# Patient Record
Sex: FEMALE | Race: BLACK OR AFRICAN AMERICAN | URBAN - METROPOLITAN AREA
[De-identification: names, ages, dates, MRNs, and addresses within clinical notes are randomized per-mention and may not be internally consistent; named-entity substitution may affect disease eponyms.]

---

## 2023-10-02 ENCOUNTER — TELEPHONE (OUTPATIENT)
Dept: PRIMARY CARE | Facility: CLINIC | Age: 38
End: 2023-10-02

## 2023-10-02 DIAGNOSIS — I10 ESSENTIAL HYPERTENSION: Primary | ICD-10-CM

## 2023-10-02 RX ORDER — IRBESARTAN AND HYDROCHLOROTHIAZIDE 150; 12.5 MG/1; MG/1
1 TABLET, FILM COATED ORAL DAILY
Qty: 90 TABLET | Refills: 1 | Status: SHIPPED | OUTPATIENT
Start: 2023-10-02 | End: 2024-01-02 | Stop reason: SDUPTHER

## 2023-10-02 RX ORDER — IBUPROFEN 800 MG/1
800 TABLET ORAL EVERY 8 HOURS PRN
COMMUNITY
End: 2024-01-02 | Stop reason: SDUPTHER

## 2023-10-02 RX ORDER — CETIRIZINE HYDROCHLORIDE 10 MG/1
10 TABLET ORAL DAILY
COMMUNITY
Start: 2023-03-23

## 2023-10-02 RX ORDER — IRBESARTAN AND HYDROCHLOROTHIAZIDE 150; 12.5 MG/1; MG/1
1 TABLET, FILM COATED ORAL DAILY
COMMUNITY
End: 2023-10-02 | Stop reason: SDUPTHER

## 2023-10-02 NOTE — TELEPHONE ENCOUNTER
Rx Refill Request Telephone Encounter    Name:  Coreysarah MUSE Parmar  :  [id number]  Medication Name:  AVIRBESARTAN            Specific Pharmacy location:  [facility]    Date of next appointment:  10/10/23 @ 11:00  Best number to reach patient:  [phone]

## 2023-10-09 PROBLEM — G89.29 OTHER CHRONIC PAIN: Status: ACTIVE | Noted: 2023-10-09

## 2023-10-09 PROBLEM — E55.9 VITAMIN D DEFICIENCY: Status: ACTIVE | Noted: 2023-10-09

## 2023-10-09 PROBLEM — I10 HYPERTENSION: Status: ACTIVE | Noted: 2023-10-09

## 2023-10-09 RX ORDER — MELOXICAM 7.5 MG/1
7.5 TABLET ORAL DAILY
COMMUNITY
Start: 2023-03-23

## 2023-10-09 RX ORDER — CHOLECALCIFEROL (VITAMIN D3) 50 MCG
2000 TABLET ORAL DAILY
COMMUNITY

## 2023-10-10 ENCOUNTER — APPOINTMENT (OUTPATIENT)
Dept: PRIMARY CARE | Facility: CLINIC | Age: 38
End: 2023-10-10

## 2023-10-13 ENCOUNTER — APPOINTMENT (OUTPATIENT)
Dept: PRIMARY CARE | Facility: CLINIC | Age: 38
End: 2023-10-13

## 2023-11-12 NOTE — PROGRESS NOTES
Subjective   Rosa M Parmar is a 37 y.o. female who presents for No chief complaint on file..    HPI:      Presenting for    ROS:   Review of systems is essentially negative for all systems except for any identified issues in HPI above.    Objective     There were no vitals taken for this visit.     PHYSICAL EXAM    GENERAL  Well-appearing, pleasant and cooperative.  No acute distress.    HEENT  HEAD:   Normocephalic.  Atraumatic.  EYES:  PERRLA.  No scleral icterus or conjunctival injection.  EARS:  Tympanic membranes visualized bilaterally without erythema, fluid, or bulging.  NECK:  No adenopathy.  No palpable thyroid enlargement or nodules.    THROAT:  Moist oropharynx without tonsillar enlargement or exudates.    LUNGS:    Clear to auscultation bilaterally.  No wheezes, rales, rhonchi.    CARDIAC:  Regular rate and rhythm.  Normal S1S2.  No murmurs/rubs/gallops.    ABDOMEN:  Soft, non-tender, non-distended.  No hepatosplenomegaly.  Normoactive bowel sounds.    MUSCULOSKELETAL:  No gross abnormalities.   No joint swelling or erythema,.  No spinal or paraspinal tenderness to palpation.    EXTREMITIES:  No LE edema or cyanosis.      NEURO           Alert and oriented x3. No focal deficits.    PSYCH:          Affect appropriate.           Assessment/Plan   Problem List Items Addressed This Visit    None           Harriett Keating MD

## 2023-11-13 ENCOUNTER — APPOINTMENT (OUTPATIENT)
Dept: PRIMARY CARE | Facility: CLINIC | Age: 38
End: 2023-11-13
Payer: OTHER GOVERNMENT

## 2023-12-29 NOTE — PROGRESS NOTES
Subjective   Rosa M Parmar is a 38 y.o. female who presents for Med Refill.    HPI:      38-year-old female presenting for medication review.  Seen by me for new patient physical exam and UC follow up on 3/23/2023.        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  She had run out of her blood pressure medication, and went to urgent care prior to her last visit with me where she was restarted on her previous medication at the same dose: Irbesartan hydrochlorothiazide 150-12.5 mg 1 tablet daily.  She had had an interval decrease in blood pressure from urgent care value of 190/106 to 140/98.  Since she had only been on the medication 1 week, the plan was to continue at current dose and have her follow-up in 2 weeks for blood pressure check/labs/med review.  Labs were not completed, no-show to appointments on 10/16/2023 and 11/1/2023.    Hx of Boils:  Not painful.  Ongoing/chronic issue.  Has had painful ones requiring       ROS:   Review of systems is essentially negative for all systems except for any identified issues in HPI above.    Objective     /90   Pulse (!) 121   Temp 36.3 °C (97.3 °F)   Ht 1.524 m (5')   Wt 79.8 kg (176 lb)   SpO2 95%   BMI 34.37 kg/m²      PHYSICAL EXAM    GENERAL  Well-appearing, pleasant and cooperative.  No acute distress.    HEENT  HEAD:   Normocephalic.  Atraumatic.  EYES:  No scleral icterus or conjunctival injection.  NECK:  No adenopathy.  No palpable thyroid enlargement or nodules.    THROAT:  Moist oropharynx without tonsillar enlargement or exudates.    LUNGS:    Clear to auscultation bilaterally.  No wheezes, rales, rhonchi.    CARDIAC:  Regular rate and rhythm.  Normal S1S2.  No murmurs/rubs/gallops.    ABDOMEN:  Soft, non-tender, non-distended.  No hepatosplenomegaly.  Normoactive bowel sounds.    :  Inguinal scarring, some slightly raised hair follicles without erythema, increased warmth,    MUSCULOSKELETAL:  No gross  abnormalities.   No joint swelling or erythema,.  No spinal or paraspinal tenderness to palpation.    EXTREMITIES:  No LE edema or cyanosis.      NEURO   Alert and oriented x3. No focal deficits.    PSYCH:   Affect appropriate.           Assessment/Plan   Problem List Items Addressed This Visit       Hypertension - Primary     Increasing irbesartan hydrochlorothiazide dose today to 2 tablets daily.         Relevant Orders    CBC    Comprehensive metabolic panel    Vitamin D deficiency    Relevant Orders    Vitamin D 25-Hydroxy,Total (for eval of Vitamin D levels)     Other Visit Diagnoses       Preventative health care        Bloodwork reordered from March physical.  Patient agrees to go get this done in the near future.    Relevant Orders    CBC    Comprehensive metabolic panel    Tsh With Reflex To Free T4 If Abnormal    Screening for cardiovascular condition        Relevant Orders    Lipid panel    Medication refill        Relevant Medications    ibuprofen 800 mg tablet    Essential hypertension        Relevant Medications    irbesartan-hydrochlorothiazide (Avalide) 150-12.5 mg tablet    Class 1 obesity with body mass index (BMI) of 34.0 to 34.9 in adult, unspecified obesity type, unspecified whether serious comorbidity present        Requesting Ozempic or other medical assistance with weight management.  Agrees to do bloodwork ordered today and follow up for further management.                 Harriett Keating MD

## 2024-01-02 ENCOUNTER — OFFICE VISIT (OUTPATIENT)
Dept: PRIMARY CARE | Facility: CLINIC | Age: 39
End: 2024-01-02
Payer: OTHER GOVERNMENT

## 2024-01-02 VITALS
OXYGEN SATURATION: 95 % | DIASTOLIC BLOOD PRESSURE: 90 MMHG | BODY MASS INDEX: 34.55 KG/M2 | TEMPERATURE: 97.3 F | WEIGHT: 176 LBS | SYSTOLIC BLOOD PRESSURE: 160 MMHG | HEART RATE: 121 BPM | HEIGHT: 60 IN

## 2024-01-02 DIAGNOSIS — Z76.0 MEDICATION REFILL: ICD-10-CM

## 2024-01-02 DIAGNOSIS — E66.9 CLASS 1 OBESITY WITH BODY MASS INDEX (BMI) OF 34.0 TO 34.9 IN ADULT, UNSPECIFIED OBESITY TYPE, UNSPECIFIED WHETHER SERIOUS COMORBIDITY PRESENT: ICD-10-CM

## 2024-01-02 DIAGNOSIS — I10 ESSENTIAL HYPERTENSION: ICD-10-CM

## 2024-01-02 DIAGNOSIS — Z00.00 PREVENTATIVE HEALTH CARE: ICD-10-CM

## 2024-01-02 DIAGNOSIS — I10 HYPERTENSION, UNSPECIFIED TYPE: Primary | ICD-10-CM

## 2024-01-02 DIAGNOSIS — Z13.6 SCREENING FOR CARDIOVASCULAR CONDITION: ICD-10-CM

## 2024-01-02 DIAGNOSIS — E55.9 VITAMIN D DEFICIENCY: ICD-10-CM

## 2024-01-02 PROCEDURE — 3008F BODY MASS INDEX DOCD: CPT | Performed by: STUDENT IN AN ORGANIZED HEALTH CARE EDUCATION/TRAINING PROGRAM

## 2024-01-02 PROCEDURE — 99214 OFFICE O/P EST MOD 30 MIN: CPT | Performed by: STUDENT IN AN ORGANIZED HEALTH CARE EDUCATION/TRAINING PROGRAM

## 2024-01-02 PROCEDURE — 3080F DIAST BP >= 90 MM HG: CPT | Performed by: STUDENT IN AN ORGANIZED HEALTH CARE EDUCATION/TRAINING PROGRAM

## 2024-01-02 PROCEDURE — 3077F SYST BP >= 140 MM HG: CPT | Performed by: STUDENT IN AN ORGANIZED HEALTH CARE EDUCATION/TRAINING PROGRAM

## 2024-01-02 RX ORDER — IBUPROFEN 800 MG/1
800 TABLET ORAL EVERY 8 HOURS PRN
Qty: 30 TABLET | Refills: 0 | Status: SHIPPED | OUTPATIENT
Start: 2024-01-02

## 2024-01-02 RX ORDER — IRBESARTAN AND HYDROCHLOROTHIAZIDE 150; 12.5 MG/1; MG/1
2 TABLET, FILM COATED ORAL DAILY
Qty: 60 TABLET | Refills: 0 | Status: SHIPPED | OUTPATIENT
Start: 2024-01-02 | End: 2024-01-30

## 2024-01-02 ASSESSMENT — PATIENT HEALTH QUESTIONNAIRE - PHQ9
SUM OF ALL RESPONSES TO PHQ9 QUESTIONS 1 AND 2: 0
2. FEELING DOWN, DEPRESSED OR HOPELESS: NOT AT ALL
1. LITTLE INTEREST OR PLEASURE IN DOING THINGS: NOT AT ALL

## 2024-01-02 ASSESSMENT — PAIN SCALES - GENERAL: PAINLEVEL: 1

## 2024-01-02 NOTE — PATIENT INSTRUCTIONS
Thank you for coming to see me today.    We will increase your dose of blood pressure medication to 2 tablets daily, new prescription sent to pharmacy.    Ibuprofen refill also sent to pharmacy.    Go to the lab for fasting blood work, we will call you with lab results.    Follow-up with me in 2 weeks (approximately) for blood pressure check, lab review, and further discussion of your other concerns.

## 2024-01-30 DIAGNOSIS — I10 ESSENTIAL HYPERTENSION: ICD-10-CM

## 2024-01-30 RX ORDER — IRBESARTAN AND HYDROCHLOROTHIAZIDE 150; 12.5 MG/1; MG/1
2 TABLET, FILM COATED ORAL DAILY
Qty: 90 TABLET | Refills: 3 | Status: SHIPPED | OUTPATIENT
Start: 2024-01-30

## 2024-06-26 DIAGNOSIS — Z76.0 MEDICATION REFILL: ICD-10-CM

## 2024-06-26 DIAGNOSIS — I10 ESSENTIAL HYPERTENSION: ICD-10-CM

## 2024-06-26 RX ORDER — IRBESARTAN AND HYDROCHLOROTHIAZIDE 150; 12.5 MG/1; MG/1
2 TABLET, FILM COATED ORAL DAILY
Qty: 60 TABLET | Refills: 0 | Status: SHIPPED | OUTPATIENT
Start: 2024-06-26 | End: 2024-07-26

## 2024-06-26 NOTE — TELEPHONE ENCOUNTER
30-day Rx sent, no refills.  It was requested that she return in 2 weeks after OV on 1/2/2024 for blood pressure check/med review, very overdue for follow-up visit.

## 2024-06-26 NOTE — TELEPHONE ENCOUNTER
PT requesting refill on RX irbesartan-hydrochlorothiazide.    Please send to [facility] on [address] in [place]

## 2024-06-27 NOTE — TELEPHONE ENCOUNTER
PT states she has been on this medication for over 10 years with no changes in dosage. PT states she can get copies of all of her past RX bottles showing this dosage. PT states she was told at her last appointment 1/2/2024, that she would not need to be seen for another year. PT confused and argumentative about needing a follow up appointment for additional refills. PT scheduled for follow up 8/2/2024, but wants to know if she really needs the appointment, as she does not want to have to pay a co-pay or bill her insurance. Please advise.

## 2024-06-27 NOTE — TELEPHONE ENCOUNTER
Her blood pressure was very elevated at her last visit with me in January - 160/90.  While we kept the medication the same we did double her dose at that time.  It is standard protocol to recheck blood pressure in 2 weeks if blood pressure is elevated in clinic to make sure it returns to normal after adjustments are made.    For me to continue managing her medications she does need to come in for appropriate follow up visits.  For reference I have copied my patient instruction notes from her last visit in January below (relevant portions in bold)    Thank you for coming to see me today.     We will increase your dose of blood pressure medication to 2 tablets daily, new prescription sent to pharmacy.     Ibuprofen refill also sent to pharmacy.     Go to the lab for fasting blood work, we will call you with lab results.     Follow-up with me in 2 weeks (approximately) for blood pressure check, lab review, and further discussion of your other concerns.

## 2024-08-29 ENCOUNTER — TELEPHONE (OUTPATIENT)
Dept: PRIMARY CARE | Facility: CLINIC | Age: 39
End: 2024-08-29

## 2024-08-29 ENCOUNTER — APPOINTMENT (OUTPATIENT)
Dept: PRIMARY CARE | Facility: CLINIC | Age: 39
End: 2024-08-29
Payer: OTHER GOVERNMENT

## 2024-08-29 VITALS
SYSTOLIC BLOOD PRESSURE: 120 MMHG | HEIGHT: 60 IN | DIASTOLIC BLOOD PRESSURE: 70 MMHG | WEIGHT: 164 LBS | HEART RATE: 78 BPM | OXYGEN SATURATION: 98 % | BODY MASS INDEX: 32.2 KG/M2

## 2024-08-29 DIAGNOSIS — Z12.31 SCREENING MAMMOGRAM FOR BREAST CANCER: ICD-10-CM

## 2024-08-29 DIAGNOSIS — I10 PRIMARY HYPERTENSION: ICD-10-CM

## 2024-08-29 DIAGNOSIS — E55.9 VITAMIN D DEFICIENCY: ICD-10-CM

## 2024-08-29 DIAGNOSIS — H60.93 OTITIS EXTERNA OF BOTH EARS, UNSPECIFIED CHRONICITY, UNSPECIFIED TYPE: ICD-10-CM

## 2024-08-29 DIAGNOSIS — I10 HYPERTENSION, UNSPECIFIED TYPE: ICD-10-CM

## 2024-08-29 DIAGNOSIS — S30.821D: ICD-10-CM

## 2024-08-29 DIAGNOSIS — Z00.00 ROUTINE GENERAL MEDICAL EXAMINATION AT A HEALTH CARE FACILITY: Primary | ICD-10-CM

## 2024-08-29 DIAGNOSIS — Z11.59 NEED FOR HEPATITIS C SCREENING TEST: ICD-10-CM

## 2024-08-29 PROBLEM — Z98.891 HISTORY OF CESAREAN SECTION: Status: RESOLVED | Noted: 2022-05-18 | Resolved: 2024-08-29

## 2024-08-29 PROBLEM — L65.9 HAIR LOSS: Status: RESOLVED | Noted: 2022-01-07 | Resolved: 2024-08-29

## 2024-08-29 PROBLEM — R11.10 VOMITING: Status: RESOLVED | Noted: 2024-08-29 | Resolved: 2024-08-29

## 2024-08-29 PROBLEM — G47.00 INSOMNIA: Status: ACTIVE | Noted: 2022-05-18

## 2024-08-29 PROBLEM — D72.829 LEUKOCYTOSIS: Status: ACTIVE | Noted: 2022-08-27

## 2024-08-29 PROBLEM — N92.1 MENOMETRORRHAGIA: Status: RESOLVED | Noted: 2022-05-18 | Resolved: 2024-08-29

## 2024-08-29 PROBLEM — R11.0 NAUSEA: Status: RESOLVED | Noted: 2024-08-29 | Resolved: 2024-08-29

## 2024-08-29 PROBLEM — G43.109 MIGRAINE WITH AURA: Status: RESOLVED | Noted: 2022-05-18 | Resolved: 2024-08-29

## 2024-08-29 PROBLEM — M54.50 LOWER BACK PAIN: Status: RESOLVED | Noted: 2022-05-18 | Resolved: 2024-08-29

## 2024-08-29 PROBLEM — H43.399 VITREOUS FLOATERS: Status: RESOLVED | Noted: 2022-05-18 | Resolved: 2024-08-29

## 2024-08-29 PROCEDURE — 3074F SYST BP LT 130 MM HG: CPT

## 2024-08-29 PROCEDURE — 99214 OFFICE O/P EST MOD 30 MIN: CPT

## 2024-08-29 PROCEDURE — 99396 PREV VISIT EST AGE 40-64: CPT

## 2024-08-29 PROCEDURE — 3008F BODY MASS INDEX DOCD: CPT

## 2024-08-29 PROCEDURE — 3078F DIAST BP <80 MM HG: CPT

## 2024-08-29 RX ORDER — IRBESARTAN AND HYDROCHLOROTHIAZIDE 150; 12.5 MG/1; MG/1
2 TABLET, FILM COATED ORAL DAILY
Qty: 180 TABLET | Refills: 1 | Status: SHIPPED | OUTPATIENT
Start: 2024-08-29 | End: 2025-02-25

## 2024-08-29 RX ORDER — CIPROFLOXACIN AND DEXAMETHASONE 3; 1 MG/ML; MG/ML
4 SUSPENSION/ DROPS AURICULAR (OTIC) 2 TIMES DAILY
Qty: 7.5 ML | Refills: 0 | Status: SHIPPED | OUTPATIENT
Start: 2024-08-29 | End: 2024-09-05

## 2024-08-29 ASSESSMENT — ENCOUNTER SYMPTOMS
BLOOD IN STOOL: 0
NERVOUS/ANXIOUS: 0
HEMATURIA: 0
SHORTNESS OF BREATH: 0
CHILLS: 0
TROUBLE SWALLOWING: 0
NUMBNESS: 0
FEVER: 0
VOMITING: 0
COUGH: 0
MYALGIAS: 0
SORE THROAT: 0
RHINORRHEA: 0
NAUSEA: 0
HEADACHES: 0
WEAKNESS: 0
ARTHRALGIAS: 0
ABDOMINAL PAIN: 0
DYSPHORIC MOOD: 0
DYSURIA: 0
UNEXPECTED WEIGHT CHANGE: 0

## 2024-08-29 ASSESSMENT — COLUMBIA-SUICIDE SEVERITY RATING SCALE - C-SSRS: 1. IN THE PAST MONTH, HAVE YOU WISHED YOU WERE DEAD OR WISHED YOU COULD GO TO SLEEP AND NOT WAKE UP?: NO

## 2024-08-29 ASSESSMENT — PAIN SCALES - GENERAL: PAINLEVEL: 3

## 2024-08-29 ASSESSMENT — LIFESTYLE VARIABLES
HOW OFTEN DO YOU HAVE A DRINK CONTAINING ALCOHOL: NEVER
HOW OFTEN DO YOU HAVE SIX OR MORE DRINKS ON ONE OCCASION: NEVER
SKIP TO QUESTIONS 9-10: 1
AUDIT-C TOTAL SCORE: 0
HOW MANY STANDARD DRINKS CONTAINING ALCOHOL DO YOU HAVE ON A TYPICAL DAY: PATIENT DOES NOT DRINK

## 2024-08-29 ASSESSMENT — PATIENT HEALTH QUESTIONNAIRE - PHQ9
1. LITTLE INTEREST OR PLEASURE IN DOING THINGS: NOT AT ALL
2. FEELING DOWN, DEPRESSED OR HOPELESS: NOT AT ALL
SUM OF ALL RESPONSES TO PHQ9 QUESTIONS 1 AND 2: 0

## 2024-08-29 NOTE — PROGRESS NOTES
Subjective   Patient ID: Rosa M Parmar is a 38 y.o. female who presents for New Patient Visit (Boils near groin area for the past 6 months ) and Annual Exam (Last EKG- 8/27/2022).    HPI   Rosa M Parmar is a new patient here to establish care and seen for her comprehensive physical exam. PMH, SH, FH, medications were reviewed and updated.     CHRONIC ISSUES:   HTN: On Irbesartan-hydrochlorithiazide 150-12.5mg BID. No medication side effects. Does not check home BP. Denies chest pain, heart palpitations, SOB, dizziness, headaches, changes of vision, syncope, leg swelling.     Boils in groin, bilateral. Happen in same spot, around menstrual cycle. Reports having drained, abx therapy.     IMMUNIZATIONS:   Influenza - gets occasionally   Tdap - 2011, declines     LUNG CANCER SCREENING (50-77 y.o. with 20+ pack year hx & current smoker or quit <15yrs ago)  Current every day smoker.     COLORECTAL SCREENING (From 43 or 10yrs younger than family diagnosis)  Last colonoscopy - Never   Next colonoscopy due - 43   Relevant FHx - None    GYN  LMP - 08/28/24  Last Pap - Reports having in 2021 in [place].   Next Pap due - 2026 pending results    MAMMOGRAM SCREENING (From age 38)   Last mammogram - Never   Next mammogram due - Due     Saw eye doctor 2021, encouraged to make yearly appointment.     Review of Systems   Constitutional:  Negative for chills, fever and unexpected weight change.   HENT:  Positive for ear pain. Negative for congestion, hearing loss, rhinorrhea, sore throat and trouble swallowing.    Eyes:  Negative for visual disturbance.   Respiratory:  Negative for cough and shortness of breath.    Cardiovascular:  Negative for chest pain and leg swelling.   Gastrointestinal:  Negative for abdominal pain, blood in stool, nausea and vomiting.   Genitourinary:  Negative for dysuria and hematuria.   Musculoskeletal:  Negative for arthralgias and myalgias.   Skin:  Positive for rash.   Neurological:  Negative for  weakness, numbness and headaches.   Psychiatric/Behavioral:  Negative for dysphoric mood. The patient is not nervous/anxious.        Objective   /70   Pulse 78   Ht 1.524 m (5')   Wt 74.4 kg (164 lb)   LMP 08/28/2024   SpO2 98%   BMI 32.03 kg/m²     Physical Exam  Vitals and nursing note reviewed. Chaperone present: Declines chaperone.   Constitutional:       General: She is not in acute distress.  HENT:      Right Ear: Tympanic membrane normal.      Left Ear: Tympanic membrane normal.      Ears:      Comments: Bilateral erythema external canal     Nose: Nose normal.      Mouth/Throat:      Mouth: Mucous membranes are moist.   Eyes:      Extraocular Movements: Extraocular movements intact.      Pupils: Pupils are equal, round, and reactive to light.   Neck:      Vascular: No carotid bruit.   Cardiovascular:      Rate and Rhythm: Normal rate and regular rhythm.   Pulmonary:      Effort: Pulmonary effort is normal.      Breath sounds: Normal breath sounds.   Abdominal:      General: Abdomen is flat.      Palpations: Abdomen is soft.      Tenderness: There is no abdominal tenderness.   Genitourinary:     Pubic Area: Rash (Subcutaneous nodules, overlying erythema and scarring. No fluctuance, drainage, active infection noted on exam.) present.   Musculoskeletal:         General: Normal range of motion.   Lymphadenopathy:      Cervical: No cervical adenopathy.   Skin:     General: Skin is warm.      Findings: No rash.   Neurological:      General: No focal deficit present.      Mental Status: She is alert.   Psychiatric:         Mood and Affect: Mood normal.       Assessment/Plan   Assessment & Plan  Routine general medical examination at a health care facility  Preventative measures discussed. Labs ordered, will follow up with results.   Immunizations discussed.     Orders:    Hepatitis C antibody; Future    Comprehensive metabolic panel; Future    Albumin-Creatinine Ratio, Urine Random; Future    Lipid  panel; Future    Tsh With Reflex To Free T4 If Abnormal; Future    Urinalysis with Reflex Microscopic; Future    Hemoglobin A1c; Future    Screening mammogram for breast cancer    Orders:    BI mammo bilateral screening tomosynthesis; Future    Need for hepatitis C screening test    Orders:    Hepatitis C antibody; Future    Primary hypertension  Chronic. Continue Irbesartan-hydrochlorithiazide 150-12.5mg BID. DASH diet and 30 minutes of exercise 5x/week. Check home BP and keep log. Recheck in 6 months.     Orders:    Comprehensive metabolic panel; Future    Albumin-Creatinine Ratio, Urine Random; Future    Urinalysis with Reflex Microscopic; Future    irbesartan-hydrochlorothiazide (Avalide) 150-12.5 mg tablet; Take 2 tablets by mouth once daily.    Vitamin D deficiency    Orders:    Vitamin D 25-Hydroxy,Total (for eval of Vitamin D levels); Future    Otitis externa of both ears, unspecified chronicity, unspecified type  Acute.   Ciprodex drops as directed. Risks and benefits of medication discussed and prescribed.   OTC Tylenol/Ibuprofen as needed for pain.   FIN 1 week or sooner for worsening.   Orders:    ciprofloxacin-dexamethasone (CiproDEX) otic suspension; Administer 4 drops into each ear 2 times a day for 7 days.    Blister of groin without infection, subsequent encounter    Orders:    Referral to Dermatology

## 2024-08-29 NOTE — ASSESSMENT & PLAN NOTE
Chronic. Continue Irbesartan-hydrochlorithiazide 150-12.5mg BID. DASH diet and 30 minutes of exercise 5x/week. Check home BP and keep log. Recheck in 6 months.     Orders:    Comprehensive metabolic panel; Future    Albumin-Creatinine Ratio, Urine Random; Future    Urinalysis with Reflex Microscopic; Future    irbesartan-hydrochlorothiazide (Avalide) 150-12.5 mg tablet; Take 2 tablets by mouth once daily.

## 2024-09-13 ENCOUNTER — LAB (OUTPATIENT)
Dept: LAB | Facility: LAB | Age: 39
End: 2024-09-13
Payer: OTHER GOVERNMENT

## 2024-09-13 DIAGNOSIS — E55.9 VITAMIN D DEFICIENCY: ICD-10-CM

## 2024-09-13 DIAGNOSIS — I10 PRIMARY HYPERTENSION: ICD-10-CM

## 2024-09-13 DIAGNOSIS — Z00.00 ROUTINE GENERAL MEDICAL EXAMINATION AT A HEALTH CARE FACILITY: ICD-10-CM

## 2024-09-13 DIAGNOSIS — Z11.59 NEED FOR HEPATITIS C SCREENING TEST: ICD-10-CM

## 2024-09-13 LAB
25(OH)D3 SERPL-MCNC: 21 NG/ML (ref 31–100)
ALBUMIN SERPL-MCNC: 4.7 G/DL (ref 3.5–5)
ALP BLD-CCNC: 70 U/L (ref 35–125)
ALT SERPL-CCNC: 5 U/L (ref 5–40)
ANION GAP SERPL CALC-SCNC: 13 MMOL/L
AST SERPL-CCNC: 12 U/L (ref 5–40)
BILIRUB SERPL-MCNC: 0.4 MG/DL (ref 0.1–1.2)
BUN SERPL-MCNC: 6 MG/DL (ref 8–25)
CALCIUM SERPL-MCNC: 9.7 MG/DL (ref 8.5–10.4)
CHLORIDE SERPL-SCNC: 98 MMOL/L (ref 97–107)
CHOLEST SERPL-MCNC: 162 MG/DL (ref 133–200)
CHOLEST/HDLC SERPL: 4.2 {RATIO}
CO2 SERPL-SCNC: 26 MMOL/L (ref 24–31)
CREAT SERPL-MCNC: 0.6 MG/DL (ref 0.4–1.6)
EGFRCR SERPLBLD CKD-EPI 2021: >90 ML/MIN/1.73M*2
EST. AVERAGE GLUCOSE BLD GHB EST-MCNC: 97 MG/DL
GLUCOSE SERPL-MCNC: 98 MG/DL (ref 65–99)
HBA1C MFR BLD: 5 %
HCV AB SER QL: NONREACTIVE
HDLC SERPL-MCNC: 39 MG/DL
LDLC SERPL CALC-MCNC: 103 MG/DL (ref 65–130)
POTASSIUM SERPL-SCNC: 3.8 MMOL/L (ref 3.4–5.1)
PROT SERPL-MCNC: 7.5 G/DL (ref 5.9–7.9)
SODIUM SERPL-SCNC: 137 MMOL/L (ref 133–145)
TRIGL SERPL-MCNC: 99 MG/DL (ref 40–150)
TSH SERPL DL<=0.05 MIU/L-ACNC: 0.56 MIU/L (ref 0.27–4.2)

## 2024-09-13 PROCEDURE — 86803 HEPATITIS C AB TEST: CPT

## 2024-09-13 PROCEDURE — 36415 COLL VENOUS BLD VENIPUNCTURE: CPT

## 2024-10-05 DIAGNOSIS — I10 PRIMARY HYPERTENSION: ICD-10-CM

## 2024-10-07 RX ORDER — IRBESARTAN AND HYDROCHLOROTHIAZIDE 150; 12.5 MG/1; MG/1
2 TABLET, FILM COATED ORAL DAILY
Qty: 90 TABLET | OUTPATIENT
Start: 2024-10-07

## 2025-02-13 ENCOUNTER — APPOINTMENT (OUTPATIENT)
Dept: PRIMARY CARE | Facility: CLINIC | Age: 40
End: 2025-02-13
Payer: OTHER GOVERNMENT

## 2025-02-24 ENCOUNTER — APPOINTMENT (OUTPATIENT)
Dept: PRIMARY CARE | Facility: CLINIC | Age: 40
End: 2025-02-24
Payer: OTHER GOVERNMENT

## 2025-02-24 VITALS
HEART RATE: 100 BPM | DIASTOLIC BLOOD PRESSURE: 76 MMHG | TEMPERATURE: 97.4 F | OXYGEN SATURATION: 94 % | SYSTOLIC BLOOD PRESSURE: 116 MMHG | BODY MASS INDEX: 35.75 KG/M2 | WEIGHT: 177 LBS

## 2025-02-24 DIAGNOSIS — R21 RASH AND NONSPECIFIC SKIN ERUPTION: Primary | ICD-10-CM

## 2025-02-24 PROCEDURE — 99213 OFFICE O/P EST LOW 20 MIN: CPT

## 2025-02-24 PROCEDURE — 3074F SYST BP LT 130 MM HG: CPT

## 2025-02-24 PROCEDURE — 3078F DIAST BP <80 MM HG: CPT

## 2025-02-24 RX ORDER — RIFAMPIN 300 MG/1
1 CAPSULE ORAL
COMMUNITY
Start: 2025-02-14 | End: 2025-02-24 | Stop reason: SINTOL

## 2025-02-24 RX ORDER — METHYLPREDNISOLONE 4 MG/1
TABLET ORAL
Qty: 21 TABLET | Refills: 0 | Status: SHIPPED | OUTPATIENT
Start: 2025-02-24 | End: 2025-03-02

## 2025-02-24 RX ORDER — SPIRONOLACTONE 100 MG/1
1 TABLET, FILM COATED ORAL
COMMUNITY
Start: 2025-02-14

## 2025-02-24 RX ORDER — CLINDAMYCIN PHOSPHATE 11.9 MG/ML
SOLUTION TOPICAL
COMMUNITY
Start: 2025-01-17

## 2025-02-24 RX ORDER — CLINDAMYCIN HYDROCHLORIDE 300 MG/1
1 CAPSULE ORAL
COMMUNITY
Start: 2025-01-17

## 2025-02-24 ASSESSMENT — ENCOUNTER SYMPTOMS
SHORTNESS OF BREATH: 0
FEVER: 0
RHINORRHEA: 0

## 2025-02-24 ASSESSMENT — PAIN SCALES - GENERAL: PAINLEVEL_OUTOF10: 10-WORST PAIN EVER

## 2025-02-24 NOTE — PROGRESS NOTES
Subjective   Patient ID: Rosa M Parmar is a 39 y.o. female who presents for Rash (Pt has been experiencing hives for the past 4 days on hands, arms, back, legs, has taken benadryl with minimal relief. Currently on new atb per derm. Suspects this possibly be the cause of rash. Started 2 weeks ago. Stopped taking 2/23/2025).    Rash  This is a new problem. The current episode started in the past 7 days. The rash is diffuse. The rash is characterized by itchiness. Associated with: Was started on Rifampin 2 weeks ago by derm, stopped taking 2 days ago. Pertinent negatives include no congestion, facial edema, fever, rhinorrhea or shortness of breath. Past treatments include antihistamine. The treatment provided mild relief.   She stopped Rifampin about 2 days ago, symptoms seem to be improving since.    Review of Systems   Constitutional:  Negative for fever.   HENT:  Negative for congestion and rhinorrhea.    Respiratory:  Negative for shortness of breath.    Skin:  Positive for rash.     Objective   /76 (BP Location: Right arm, Patient Position: Sitting)   Pulse 100   Temp 36.3 °C (97.4 °F) (Temporal)   Wt 80.3 kg (177 lb)   LMP 02/15/2025   SpO2 94%   BMI 35.75 kg/m²     Physical Exam  Vitals and nursing note reviewed.   Constitutional:       General: She is not in acute distress.  HENT:      Mouth/Throat:      Mouth: Mucous membranes are moist.   Eyes:      Extraocular Movements: Extraocular movements intact.      Conjunctiva/sclera: Conjunctivae normal.   Cardiovascular:      Rate and Rhythm: Normal rate and regular rhythm.   Pulmonary:      Effort: Pulmonary effort is normal.      Breath sounds: Normal breath sounds.   Musculoskeletal:      Cervical back: Neck supple.   Skin:     General: Skin is warm.      Findings: Rash present. Rash is macular.      Comments: Palms, forearms. Negative for desquamation, vesicles.    Neurological:      General: No focal deficit present.      Mental Status: She is  alert.   Psychiatric:         Mood and Affect: Mood normal.       Assessment/Plan   Assessment & Plan  Rash and nonspecific skin eruption  Acute.   Medrol Dospak as directed. Risks and benefits of medication discussed and prescribed.   OTC antihistamine as needed for itching.   Recommend follow up with dermatology to discuss possible reaction to Rifampin.   Go to ER if condition worsens or becomes life-threatening.     Orders:    methylPREDNISolone (Medrol Dospak) 4 mg tablets; Take as directed on package.

## 2025-03-04 ENCOUNTER — APPOINTMENT (OUTPATIENT)
Dept: DERMATOLOGY | Facility: CLINIC | Age: 40
End: 2025-03-04
Payer: OTHER GOVERNMENT

## 2025-03-12 ENCOUNTER — OFFICE VISIT (OUTPATIENT)
Dept: PRIMARY CARE | Facility: CLINIC | Age: 40
End: 2025-03-12
Payer: OTHER GOVERNMENT

## 2025-03-12 VITALS
DIASTOLIC BLOOD PRESSURE: 70 MMHG | TEMPERATURE: 97 F | WEIGHT: 180 LBS | BODY MASS INDEX: 36.36 KG/M2 | HEART RATE: 102 BPM | SYSTOLIC BLOOD PRESSURE: 130 MMHG | OXYGEN SATURATION: 99 %

## 2025-03-12 DIAGNOSIS — R21 RASH AND NONSPECIFIC SKIN ERUPTION: Primary | ICD-10-CM

## 2025-03-12 PROCEDURE — 99213 OFFICE O/P EST LOW 20 MIN: CPT

## 2025-03-12 PROCEDURE — 3075F SYST BP GE 130 - 139MM HG: CPT

## 2025-03-12 PROCEDURE — 3078F DIAST BP <80 MM HG: CPT

## 2025-03-12 RX ORDER — FAMOTIDINE 20 MG/1
20 TABLET, FILM COATED ORAL 2 TIMES DAILY PRN
Qty: 60 TABLET | Refills: 0 | Status: SHIPPED | OUTPATIENT
Start: 2025-03-12 | End: 2025-04-11

## 2025-03-12 ASSESSMENT — PAIN SCALES - GENERAL: PAINLEVEL_OUTOF10: 0-NO PAIN

## 2025-03-12 NOTE — PROGRESS NOTES
Subjective   Patient ID: Rosa M Parmar is a 39 y.o. female who presents for Rash (Pt is experiencing itchy rash all over her body. Per last visit, pt took methylprednisone as prescribed and had relief for one day but overall, things haven't improved.).    HPI   Rosa M is seen for c/o diffuse rash. Was on short course of steroid which seemed to worsen things. Taking Benadryl 3-4x/day with little relief. No known triggers. Has appointment with dermatology 3/21/25. Dermatologist sent her in some Triamcinolone which relieved itching for the moment. No active rash. Pertinent negatives include no congestion, facial edema, fever, rhinorrhea or shortness of breath.     Review of Systems  All other systems have been reviewed and are negative except as noted in the HPI.     Objective   /70 (BP Location: Left arm, Patient Position: Sitting)   Pulse 102   Temp 36.1 °C (97 °F)   Wt 81.6 kg (180 lb)   LMP 02/15/2025 (Exact Date)   SpO2 99%   BMI 36.36 kg/m²     Physical Exam  Vitals and nursing note reviewed.   Constitutional:       General: She is not in acute distress.  HENT:      Nose: Nose normal.      Mouth/Throat:      Mouth: Mucous membranes are moist.   Cardiovascular:      Rate and Rhythm: Normal rate and regular rhythm.   Pulmonary:      Effort: Pulmonary effort is normal.      Breath sounds: Normal breath sounds.   Musculoskeletal:      Cervical back: Neck supple.   Skin:     Findings: No rash.      Comments: No active rash/urticaria noted on exam   Neurological:      General: No focal deficit present.      Mental Status: She is alert.   Psychiatric:         Mood and Affect: Mood normal.       Assessment/Plan   Assessment & Plan  Rash and nonspecific skin eruption  Recurrent, needs better control.   OTC Benadryl 25-50mg 3-4 days PRN. Add Pepcid 20mg BID. Keep skin moisturized.   Follow up with dermatology as scheduled.   Referral to allergy placed today.   Go to ER if condition worsens or becomes  life-threatening.      Orders:    Referral to Allergy; Future    famotidine (Pepcid) 20 mg tablet; Take 1 tablet (20 mg) by mouth 2 times a day as needed (itching).    CBC and Auto Differential; Future

## 2025-03-13 ENCOUNTER — APPOINTMENT (OUTPATIENT)
Dept: PRIMARY CARE | Facility: CLINIC | Age: 40
End: 2025-03-13
Payer: OTHER GOVERNMENT

## 2025-04-08 DIAGNOSIS — R21 RASH AND NONSPECIFIC SKIN ERUPTION: ICD-10-CM

## 2025-04-08 RX ORDER — FAMOTIDINE 20 MG/1
TABLET, FILM COATED ORAL
Qty: 60 TABLET | Refills: 0 | OUTPATIENT
Start: 2025-04-08

## 2025-04-18 ENCOUNTER — TELEPHONE (OUTPATIENT)
Dept: PRIMARY CARE | Facility: CLINIC | Age: 40
End: 2025-04-18
Payer: OTHER GOVERNMENT

## 2025-04-18 DIAGNOSIS — E55.9 VITAMIN D DEFICIENCY: ICD-10-CM

## 2025-04-18 DIAGNOSIS — Z00.00 GENERAL MEDICAL EXAM: ICD-10-CM

## 2025-04-18 DIAGNOSIS — I10 PRIMARY HYPERTENSION: ICD-10-CM

## 2025-04-18 RX ORDER — IRBESARTAN AND HYDROCHLOROTHIAZIDE 150; 12.5 MG/1; MG/1
2 TABLET, FILM COATED ORAL DAILY
Qty: 60 TABLET | Refills: 0 | Status: SHIPPED | OUTPATIENT
Start: 2025-04-18 | End: 2025-05-18

## 2025-04-30 ENCOUNTER — TELEPHONE (OUTPATIENT)
Dept: PRIMARY CARE | Facility: CLINIC | Age: 40
End: 2025-04-30

## 2025-04-30 ENCOUNTER — APPOINTMENT (OUTPATIENT)
Dept: PRIMARY CARE | Facility: CLINIC | Age: 40
End: 2025-04-30
Payer: OTHER GOVERNMENT

## 2025-04-30 VITALS
WEIGHT: 178 LBS | SYSTOLIC BLOOD PRESSURE: 130 MMHG | OXYGEN SATURATION: 96 % | HEART RATE: 107 BPM | BODY MASS INDEX: 35.95 KG/M2 | TEMPERATURE: 97.3 F | DIASTOLIC BLOOD PRESSURE: 84 MMHG

## 2025-04-30 DIAGNOSIS — Z00.00 WELL ADULT EXAM: ICD-10-CM

## 2025-04-30 DIAGNOSIS — Z00.00 GENERAL MEDICAL EXAM: ICD-10-CM

## 2025-04-30 DIAGNOSIS — Z71.85 VACCINE COUNSELING: ICD-10-CM

## 2025-04-30 DIAGNOSIS — I10 PRIMARY HYPERTENSION: ICD-10-CM

## 2025-04-30 DIAGNOSIS — Z13.220 LIPID SCREENING: ICD-10-CM

## 2025-04-30 DIAGNOSIS — E55.9 VITAMIN D DEFICIENCY: ICD-10-CM

## 2025-04-30 DIAGNOSIS — I10 HYPERTENSION, UNSPECIFIED TYPE: Primary | ICD-10-CM

## 2025-04-30 PROCEDURE — 99213 OFFICE O/P EST LOW 20 MIN: CPT

## 2025-04-30 PROCEDURE — 3079F DIAST BP 80-89 MM HG: CPT

## 2025-04-30 PROCEDURE — 3074F SYST BP LT 130 MM HG: CPT

## 2025-04-30 ASSESSMENT — ENCOUNTER SYMPTOMS
HYPERTENSION: 1
SHORTNESS OF BREATH: 0
PND: 0
NECK PAIN: 0
ORTHOPNEA: 0
SWEATS: 0
PALPITATIONS: 0
HEADACHES: 0
BLURRED VISION: 0

## 2025-04-30 ASSESSMENT — PATIENT HEALTH QUESTIONNAIRE - PHQ9
2. FEELING DOWN, DEPRESSED OR HOPELESS: NOT AT ALL
1. LITTLE INTEREST OR PLEASURE IN DOING THINGS: NOT AT ALL
SUM OF ALL RESPONSES TO PHQ9 QUESTIONS 1 AND 2: 0

## 2025-04-30 ASSESSMENT — PAIN SCALES - GENERAL: PAINLEVEL_OUTOF10: 0-NO PAIN

## 2025-04-30 NOTE — PROGRESS NOTES
Subjective   Patient ID: Rosa M Parmar is a 39 y.o. female who presents for Follow-up (BP follow up).    Hypertension  This is a recurrent problem. The current episode started more than 1 year ago. The problem is unchanged. The problem is controlled. Pertinent negatives include no anxiety, blurred vision, chest pain, headaches, malaise/fatigue, neck pain, orthopnea, palpitations, peripheral edema, PND, shortness of breath or sweats. There are no associated agents to hypertension. There are no known risk factors for coronary artery disease. There are no compliance problems.       HTN: On Irbesartan-hydrochlorithiazide 150-12.5mg BID. No medication side effects. Checks home BP occasionally, 120-130/80-90's. Denies chest pain, heart palpitations, SOB, dizziness, headaches, changes of vision, syncope, leg swelling.      Review of Systems   Constitutional:  Negative for malaise/fatigue.   Eyes:  Negative for blurred vision.   Respiratory:  Negative for shortness of breath.    Cardiovascular:  Negative for chest pain, palpitations, orthopnea and PND.   Musculoskeletal:  Negative for neck pain.   Neurological:  Negative for headaches.     Objective   /84 (BP Location: Left arm)   Pulse 107   Temp 36.3 °C (97.3 °F) (Temporal)   Wt 80.7 kg (178 lb)   LMP 03/15/2025   SpO2 96%   BMI 35.95 kg/m²     Physical Exam  Vitals and nursing note reviewed.   Constitutional:       General: She is not in acute distress.     Appearance: Normal appearance.   Eyes:      Extraocular Movements: Extraocular movements intact.      Conjunctiva/sclera: Conjunctivae normal.   Neck:      Vascular: No carotid bruit.   Cardiovascular:      Rate and Rhythm: Normal rate and regular rhythm.   Pulmonary:      Effort: Pulmonary effort is normal.      Breath sounds: Normal breath sounds.   Musculoskeletal:      Cervical back: Neck supple.      Right lower leg: No edema.      Left lower leg: No edema.   Neurological:      General: No focal  deficit present.      Mental Status: She is alert.   Psychiatric:         Mood and Affect: Mood normal.       Assessment/Plan   Assessment & Plan  Hypertension, unspecified type  Chronic. Continue Irbesartan-hydrochlorithiazide 150-12.5mg BID. DASH diet and 30 minutes of exercise 5x/week. Check home BP and keep log. Recheck at CPE in 09/25. Patient encouraged to call office sooner for persistent home readings >140/90.     Orders:    Urinalysis with Reflex Microscopic; Future    Vaccine counseling  Labs ordered, will follow up with results.     Orders:    T-Spot TB; Future    Hepatitis B surface antibody; Future    Varicella Zoster Antibody, IgG; Future

## 2025-05-01 NOTE — ASSESSMENT & PLAN NOTE
Chronic. Continue Irbesartan-hydrochlorithiazide 150-12.5mg BID. DASH diet and 30 minutes of exercise 5x/week. Check home BP and keep log. Recheck at CPE in 09/25. Patient encouraged to call office sooner for persistent home readings >140/90.     Orders:    Urinalysis with Reflex Microscopic; Future

## 2025-05-07 LAB
25(OH)D3+25(OH)D2 SERPL-MCNC: 32 NG/ML (ref 30–100)
ALBUMIN SERPL-MCNC: 4.4 G/DL (ref 3.6–5.1)
ALP SERPL-CCNC: 58 U/L (ref 31–125)
ALT SERPL-CCNC: 12 U/L (ref 6–29)
ANION GAP SERPL CALCULATED.4IONS-SCNC: 12 MMOL/L (CALC) (ref 7–17)
AST SERPL-CCNC: 19 U/L (ref 10–30)
BILIRUB SERPL-MCNC: 0.2 MG/DL (ref 0.2–1.2)
BUN SERPL-MCNC: 7 MG/DL (ref 7–25)
CALCIUM SERPL-MCNC: 9.6 MG/DL (ref 8.6–10.2)
CHLORIDE SERPL-SCNC: 101 MMOL/L (ref 98–110)
CO2 SERPL-SCNC: 26 MMOL/L (ref 20–32)
CREAT SERPL-MCNC: 0.59 MG/DL (ref 0.5–0.99)
EGFRCR SERPLBLD CKD-EPI 2021: 116 ML/MIN/1.73M2
GLUCOSE SERPL-MCNC: 89 MG/DL (ref 65–139)
POTASSIUM SERPL-SCNC: 4.3 MMOL/L (ref 3.5–5.3)
PROT SERPL-MCNC: 7.3 G/DL (ref 6.1–8.1)
SODIUM SERPL-SCNC: 139 MMOL/L (ref 135–146)

## 2025-05-09 LAB
APPEARANCE UR: CLEAR
BILIRUB UR QL STRIP: NEGATIVE
COLOR UR: YELLOW
GLUCOSE UR QL STRIP: NEGATIVE
HBV SURFACE AB SERPL IA-ACNC: >1000 MIU/ML
HGB UR QL STRIP: NEGATIVE
IGNF NEG CNTRL BLD: NORMAL
KETONES UR QL STRIP: NEGATIVE
LEUKOCYTE ESTERASE UR QL STRIP: NEGATIVE
M TB IFN-G BLD-IMP: NEGATIVE
MITOGEN IGNF.SPOT COUNT BLD: NORMAL
NITRITE UR QL STRIP: NEGATIVE
PH UR STRIP: 6 [PH] (ref 5–8)
PROT UR QL STRIP: NEGATIVE
QUEST PANEL A SPOT COUNT: 1
QUEST PANEL B SPOT COUNT: 0
SP GR UR STRIP: 1.01 (ref 1–1.03)
VZV IGG SER IA-ACNC: 8.83 S/CO

## 2025-05-10 LAB
BASOPHILS # BLD AUTO: 47 CELLS/UL (ref 0–200)
BASOPHILS NFR BLD AUTO: 0.5 %
EOSINOPHIL # BLD AUTO: 461 CELLS/UL (ref 15–500)
EOSINOPHIL NFR BLD AUTO: 4.9 %
ERYTHROCYTE [DISTWIDTH] IN BLOOD BY AUTOMATED COUNT: 12.9 % (ref 11–15)
HCT VFR BLD AUTO: 39.7 % (ref 35–45)
HGB BLD-MCNC: 13 G/DL (ref 11.7–15.5)
LYMPHOCYTES # BLD AUTO: 3027 CELLS/UL (ref 850–3900)
LYMPHOCYTES NFR BLD AUTO: 32.2 %
MCH RBC QN AUTO: 29.7 PG (ref 27–33)
MCHC RBC AUTO-ENTMCNC: 32.7 G/DL (ref 32–36)
MCV RBC AUTO: 90.8 FL (ref 80–100)
MONOCYTES # BLD AUTO: 602 CELLS/UL (ref 200–950)
MONOCYTES NFR BLD AUTO: 6.4 %
NEUTROPHILS # BLD AUTO: 5264 CELLS/UL (ref 1500–7800)
NEUTROPHILS NFR BLD AUTO: 56 %
PLATELET # BLD AUTO: 325 THOUSAND/UL (ref 140–400)
PMV BLD REES-ECKER: 11.1 FL (ref 7.5–12.5)
RBC # BLD AUTO: 4.37 MILLION/UL (ref 3.8–5.1)
WBC # BLD AUTO: 9.4 THOUSAND/UL (ref 3.8–10.8)

## 2025-07-16 ENCOUNTER — PATIENT MESSAGE (OUTPATIENT)
Dept: PRIMARY CARE | Facility: CLINIC | Age: 40
End: 2025-07-16
Payer: OTHER GOVERNMENT

## 2025-07-16 DIAGNOSIS — I10 PRIMARY HYPERTENSION: ICD-10-CM

## 2025-07-16 RX ORDER — IRBESARTAN AND HYDROCHLOROTHIAZIDE 150; 12.5 MG/1; MG/1
2 TABLET, FILM COATED ORAL DAILY
Qty: 180 TABLET | Refills: 0 | Status: SHIPPED | OUTPATIENT
Start: 2025-07-16

## 2025-08-05 DIAGNOSIS — I10 PRIMARY HYPERTENSION: ICD-10-CM

## 2025-08-05 DIAGNOSIS — E55.9 VITAMIN D DEFICIENCY: ICD-10-CM

## 2025-08-05 DIAGNOSIS — Z00.00 WELL ADULT EXAM: ICD-10-CM

## 2025-08-05 DIAGNOSIS — Z13.220 LIPID SCREENING: ICD-10-CM

## 2025-08-05 DIAGNOSIS — Z00.00 GENERAL MEDICAL EXAM: ICD-10-CM

## 2025-08-19 DIAGNOSIS — Z12.31 ENCOUNTER FOR SCREENING MAMMOGRAM FOR BREAST CANCER: ICD-10-CM

## 2025-09-05 ENCOUNTER — APPOINTMENT (OUTPATIENT)
Dept: PRIMARY CARE | Facility: CLINIC | Age: 40
End: 2025-09-05
Payer: OTHER GOVERNMENT

## 2025-09-05 ENCOUNTER — TELEPHONE (OUTPATIENT)
Dept: PRIMARY CARE | Facility: CLINIC | Age: 40
End: 2025-09-05

## 2025-09-05 DIAGNOSIS — I10 PRIMARY HYPERTENSION: ICD-10-CM

## 2025-09-05 DIAGNOSIS — E55.9 VITAMIN D DEFICIENCY: ICD-10-CM

## 2025-09-05 DIAGNOSIS — Z00.00 WELL ADULT EXAM: ICD-10-CM

## 2025-09-05 PROBLEM — R51.9 HEADACHE: Status: RESOLVED | Noted: 2025-09-05 | Resolved: 2025-09-05

## 2025-09-05 PROBLEM — R10.2 SUPRAPUBIC PAIN: Status: ACTIVE | Noted: 2025-09-05

## 2025-09-05 PROBLEM — O13.9 PREGNANCY-INDUCED HYPERTENSION (HHS-HCC): Status: ACTIVE | Noted: 2025-09-05

## 2025-09-05 PROBLEM — K27.3 ACUTE PEPTIC ULCER: Status: ACTIVE | Noted: 2025-09-05
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
## 2025-09-05 PROBLEM — K29.70 GASTRITIS: Status: ACTIVE | Noted: 2025-09-05

## 2025-09-05 ASSESSMENT — PROMIS GLOBAL HEALTH SCALE
RATE_PHYSICAL_HEALTH: GOOD
EMOTIONAL_PROBLEMS: NEVER
CARRYOUT_PHYSICAL_ACTIVITIES: MOSTLY
RATE_AVERAGE_PAIN: 0
RATE_SOCIAL_SATISFACTION: GOOD
CARRYOUT_SOCIAL_ACTIVITIES: GOOD
RATE_GENERAL_HEALTH: GOOD
RATE_AVERAGE_FATIGUE: MILD
RATE_MENTAL_HEALTH: GOOD
RATE_QUALITY_OF_LIFE: GOOD

## 2025-09-05 ASSESSMENT — COLUMBIA-SUICIDE SEVERITY RATING SCALE - C-SSRS
1. IN THE PAST MONTH, HAVE YOU WISHED YOU WERE DEAD OR WISHED YOU COULD GO TO SLEEP AND NOT WAKE UP?: NO
6. HAVE YOU EVER DONE ANYTHING, STARTED TO DO ANYTHING, OR PREPARED TO DO ANYTHING TO END YOUR LIFE?: NO
2. HAVE YOU ACTUALLY HAD ANY THOUGHTS OF KILLING YOURSELF?: NO

## 2025-12-16 ENCOUNTER — APPOINTMENT (OUTPATIENT)
Dept: PRIMARY CARE | Facility: CLINIC | Age: 40
End: 2025-12-16
Payer: OTHER GOVERNMENT